# Patient Record
Sex: MALE | Race: WHITE | NOT HISPANIC OR LATINO
[De-identification: names, ages, dates, MRNs, and addresses within clinical notes are randomized per-mention and may not be internally consistent; named-entity substitution may affect disease eponyms.]

---
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
## 2021-01-08 ENCOUNTER — TRANSCRIPTION ENCOUNTER (OUTPATIENT)
Age: 21
End: 2021-01-08

## 2021-01-27 ENCOUNTER — NON-APPOINTMENT (OUTPATIENT)
Age: 21
End: 2021-01-27

## 2021-03-16 ENCOUNTER — NON-APPOINTMENT (OUTPATIENT)
Age: 21
End: 2021-03-16

## 2021-07-13 ENCOUNTER — RX CHANGE (OUTPATIENT)
Age: 21
End: 2021-07-13

## 2021-08-06 LAB
ALBUMIN SERPL ELPH-MCNC: 4.7 G/DL
ALP BLD-CCNC: 89 U/L
ALT SERPL-CCNC: 23 U/L
ANION GAP SERPL CALC-SCNC: 12 MMOL/L
AST SERPL-CCNC: 16 U/L
BASOPHILS # BLD AUTO: 0.03 K/UL
BASOPHILS NFR BLD AUTO: 0.5 %
BILIRUB SERPL-MCNC: 0.4 MG/DL
BUN SERPL-MCNC: 17 MG/DL
CALCIUM SERPL-MCNC: 10 MG/DL
CHLORIDE SERPL-SCNC: 103 MMOL/L
CO2 SERPL-SCNC: 26 MMOL/L
CREAT SERPL-MCNC: 1.01 MG/DL
EOSINOPHIL # BLD AUTO: 0.07 K/UL
EOSINOPHIL NFR BLD AUTO: 1.1 %
HCT VFR BLD CALC: 45.5 %
HGB BLD-MCNC: 15.9 G/DL
IMM GRANULOCYTES NFR BLD AUTO: 0.2 %
LYMPHOCYTES # BLD AUTO: 2.84 K/UL
LYMPHOCYTES NFR BLD AUTO: 44.9 %
MAN DIFF?: NORMAL
MCHC RBC-ENTMCNC: 30.8 PG
MCHC RBC-ENTMCNC: 34.9 GM/DL
MCV RBC AUTO: 88.2 FL
MONOCYTES # BLD AUTO: 0.49 K/UL
MONOCYTES NFR BLD AUTO: 7.8 %
NEUTROPHILS # BLD AUTO: 2.88 K/UL
NEUTROPHILS NFR BLD AUTO: 45.5 %
PLATELET # BLD AUTO: 201 K/UL
POTASSIUM SERPL-SCNC: 4.4 MMOL/L
PROT SERPL-MCNC: 6.8 G/DL
RBC # BLD: 5.16 M/UL
RBC # FLD: 12.7 %
SODIUM SERPL-SCNC: 141 MMOL/L
WBC # FLD AUTO: 6.32 K/UL

## 2021-08-09 LAB — OXCARBAZEPINE SERPL-MCNC: 13 UG/ML

## 2021-08-10 ENCOUNTER — APPOINTMENT (OUTPATIENT)
Dept: PEDIATRIC NEUROLOGY | Facility: CLINIC | Age: 21
End: 2021-08-10
Payer: COMMERCIAL

## 2021-08-10 PROCEDURE — 99214 OFFICE O/P EST MOD 30 MIN: CPT | Mod: 95

## 2021-08-10 NOTE — HISTORY OF PRESENT ILLNESS
[Home] : at home, [unfilled] , at the time of the visit. [Other Location: e.g. Home (Enter Location, City,State)___] : at [unfilled] [FreeTextEntry1] : 6/2015: Gordon was last seen here on February 12, 2014 . He is on Trileptal 150 mg 2 tablets twice a day. His EEG from January 26, 2014 showed spikes, right central parietal; altogether he had 2 seizures. Both seizures were convulsive the last one on January 4, 2014. During each seizures he also feels right-sided numbness. His brain CAT scan and MRI were normal in the past.\par \par 1/12/2016   accompanied by his parents. Ambulatory EEG from December 2015 continues to show right central temporal spikes. He is now on Trileptal 150 mg tablets 3 tablets twice daily and has been seizure-free for nearly 2 years. No other physical complaints. Most recent cardiological evaluation ruled out cardiomyopathy. \par \par 6/8/16   accompanied by his mother. Remains seizure-free on 150 mg tablets 3 tablets twice daily. No physical complaints\par \par 12/13/2016  Here with mother. Remains seizure free on above dose of Trileptal; No physical complaints\par \par 6/14/2017: with mother. Remains seizure free on above dose of Trileptal; No physical complaints. Mother reported that [facility] restarted Gordon on Metoprolol 50mg reportedly for enlarged heart.\par 6/9/17 CBC, CMP normal. Trileptal level 16. \par \par 9/26/17: with mother. Remains seizure free. Driving.  On Metoprolol reportedly for enlarged hear. Trileptal level 12 (10-35). On 9/21/17 17 EEG continues to show right centro-temporal spikes. \par \par 3/2/2018: with parents. Remains seizure for 4 years. On Metoprolol reportedly for enlarged heart. Last Trileptal level 12 (10-35). On 9/21/17 17 EEG continues to show right centro-temporal spikes. \par \par 10/30/2018:  with parents. Remains seizure for 4 years. On Metoprolol reportedly for enlarged heart. EEG continues to show right centro-temporal spikes. On Oxcarbazepine 600mg BID. \par \par 6/23/2020 with mother in a Telehealth visit. Remains seizure for >4 years. On Nadolol for enlarged heart. EEG continues to show right centro-temporal spikes. On Oxcarbazepine 600mg BID. \par \par 12/1/2020 with mother in a Telehealth visit. Remains seizure for >4 years. On Nadolol for enlarged heart. EEG continues to show right centro-temporal spikes. On Oxcarbazepine 600mg BID. \par \par 8/10/2021 with mother in a Telehealth visit. Remains seizure for >6  years. Seeing a cardiologist in [facility] \par  On Oxcarbazepine 600mg BID. \par \par \par \par \par

## 2021-08-10 NOTE — ASSESSMENT
[FreeTextEntry1] : \par Remains seizure free on Oxcarbazepine 600mg BID. Normal limited exam \par Not interested in coming off medication or in changing to an ER formulation. \par \par 
Universal Safety Interventions

## 2021-08-10 NOTE — PHYSICAL EXAM
[Well-appearing] : well-appearing [Normocephalic] : normocephalic [No dysmorphic facial features] : no dysmorphic facial features [No ocular abnormalities] : no ocular abnormalities [Alert] : alert [Conversant] : conversant [Normal speech and language] : normal speech and language [Follows instructions well] : follows instructions well [Full extraocular movements] : full extraocular movements [No nystagmus] : no nystagmus [No facial asymmetry or weakness] : no facial asymmetry or weakness [No abnormal involuntary movements] : no abnormal involuntary movements [Walks and runs well] : walks and runs well [Normal gait] : normal gait

## 2022-03-24 ENCOUNTER — RX RENEWAL (OUTPATIENT)
Age: 22
End: 2022-03-24

## 2022-03-25 ENCOUNTER — NON-APPOINTMENT (OUTPATIENT)
Age: 22
End: 2022-03-25

## 2022-04-20 ENCOUNTER — RX CHANGE (OUTPATIENT)
Age: 22
End: 2022-04-20

## 2022-06-07 LAB
ALBUMIN SERPL ELPH-MCNC: 4.9 G/DL
ALP BLD-CCNC: 90 U/L
ALT SERPL-CCNC: 22 U/L
ANION GAP SERPL CALC-SCNC: 16 MMOL/L
AST SERPL-CCNC: 20 U/L
BASOPHILS # BLD AUTO: 0.02 K/UL
BASOPHILS NFR BLD AUTO: 0.3 %
BILIRUB SERPL-MCNC: 0.2 MG/DL
BUN SERPL-MCNC: 17 MG/DL
CALCIUM SERPL-MCNC: 9.8 MG/DL
CHLORIDE SERPL-SCNC: 104 MMOL/L
CO2 SERPL-SCNC: 22 MMOL/L
CREAT SERPL-MCNC: 1.09 MG/DL
EGFR: 99 ML/MIN/1.73M2
EOSINOPHIL # BLD AUTO: 0.08 K/UL
EOSINOPHIL NFR BLD AUTO: 1.4 %
GLUCOSE SERPL-MCNC: 88 MG/DL
HCT VFR BLD CALC: 48.3 %
HGB BLD-MCNC: 16.2 G/DL
IMM GRANULOCYTES NFR BLD AUTO: 0 %
LYMPHOCYTES # BLD AUTO: 2.2 K/UL
LYMPHOCYTES NFR BLD AUTO: 37.9 %
MAN DIFF?: NORMAL
MCHC RBC-ENTMCNC: 29.3 PG
MCHC RBC-ENTMCNC: 33.5 GM/DL
MCV RBC AUTO: 87.5 FL
MONOCYTES # BLD AUTO: 0.42 K/UL
MONOCYTES NFR BLD AUTO: 7.2 %
NEUTROPHILS # BLD AUTO: 3.08 K/UL
NEUTROPHILS NFR BLD AUTO: 53.2 %
PLATELET # BLD AUTO: 218 K/UL
POTASSIUM SERPL-SCNC: 4.5 MMOL/L
PROT SERPL-MCNC: 7.1 G/DL
RBC # BLD: 5.52 M/UL
RBC # FLD: 12.4 %
SODIUM SERPL-SCNC: 142 MMOL/L
WBC # FLD AUTO: 5.8 K/UL

## 2022-06-09 ENCOUNTER — APPOINTMENT (OUTPATIENT)
Dept: PEDIATRIC NEUROLOGY | Facility: CLINIC | Age: 22
End: 2022-06-09
Payer: COMMERCIAL

## 2022-06-09 PROCEDURE — 99214 OFFICE O/P EST MOD 30 MIN: CPT | Mod: 95

## 2022-06-09 NOTE — HISTORY OF PRESENT ILLNESS
[Home] : at home, [unfilled] , at the time of the visit. [Other Location: e.g. Home (Enter Location, City,State)___] : at [unfilled] [FreeTextEntry1] : 6/2015: Gordon was last seen here on February 12, 2014 . He is on Trileptal 150 mg 2 tablets twice a day. His EEG from January 26, 2014 showed spikes, right central parietal; altogether he had 2 seizures. Both seizures were convulsive the last one on January 4, 2014. During each seizures he also feels right-sided numbness. His brain CAT scan and MRI were normal in the past.\par \par 1/12/2016   accompanied by his parents. Ambulatory EEG from December 2015 continues to show right central temporal spikes. He is now on Trileptal 150 mg tablets 3 tablets twice daily and has been seizure-free for nearly 2 years. No other physical complaints. Most recent cardiological evaluation ruled out cardiomyopathy. \par \par 6/8/16   accompanied by his mother. Remains seizure-free on 150 mg tablets 3 tablets twice daily. No physical complaints\par \par 12/13/2016  Here with mother. Remains seizure free on above dose of Trileptal; No physical complaints\par \par 6/14/2017: with mother. Remains seizure free on above dose of Trileptal; No physical complaints. Mother reported that [facility] restarted Gordon on Metoprolol 50mg reportedly for enlarged heart.\par 6/9/17 CBC, CMP normal. Trileptal level 16. \par \par 9/26/17: with mother. Remains seizure free. Driving.  On Metoprolol reportedly for enlarged hear. Trileptal level 12 (10-35). On 9/21/17 17 EEG continues to show right centro-temporal spikes. \par \par 3/2/2018: with parents. Remains seizure for 4 years. On Metoprolol reportedly for enlarged heart. Last Trileptal level 12 (10-35). On 9/21/17 17 EEG continues to show right centro-temporal spikes. \par \par 10/30/2018:  with parents. Remains seizure for 4 years. On Metoprolol reportedly for enlarged heart. EEG continues to show right centro-temporal spikes. On Oxcarbazepine 600mg BID. \par \par 6/23/2020 with mother in a Telehealth visit. Remains seizure for >4 years. On Nadolol for enlarged heart. EEG continues to show right centro-temporal spikes. On Oxcarbazepine 600mg BID. \par \par 12/1/2020 with mother in a Telehealth visit. Remains seizure for >4 years. On Nadolol for enlarged heart. EEG continues to show right centro-temporal spikes. On Oxcarbazepine 600mg BID. \par \par 8/10/2021 with mother in a Telehealth visit. Remains seizure for >6  years. Seeing a cardiologist in [facility] \par  On Oxcarbazepine 600mg BID. \par \par 6/9/2022  with mother in a Telehealth visit. Remains seizure for >6  years. Seeing a cardiologist in [facility] \par  On Oxcarbazepine 600mg BID. \par \par \par \par \par

## 2022-06-09 NOTE — ASSESSMENT
[FreeTextEntry1] : \par Remains seizure free on Oxcarbazepine 600mg BID. Normal limited exam \par Will consider trying to come off medication next year.  \par \par

## 2022-06-13 LAB — OXCARBAZEPINE SERPL-MCNC: 14 UG/ML

## 2022-08-12 ENCOUNTER — RX RENEWAL (OUTPATIENT)
Age: 22
End: 2022-08-12

## 2022-08-17 ENCOUNTER — NON-APPOINTMENT (OUTPATIENT)
Age: 22
End: 2022-08-17

## 2022-11-13 ENCOUNTER — RX RENEWAL (OUTPATIENT)
Age: 22
End: 2022-11-13

## 2023-05-11 ENCOUNTER — RX RENEWAL (OUTPATIENT)
Age: 23
End: 2023-05-11

## 2023-05-11 ENCOUNTER — RX CHANGE (OUTPATIENT)
Age: 23
End: 2023-05-11

## 2023-05-16 ENCOUNTER — APPOINTMENT (OUTPATIENT)
Dept: PEDIATRIC NEUROLOGY | Facility: CLINIC | Age: 23
End: 2023-05-16
Payer: COMMERCIAL

## 2023-05-16 ENCOUNTER — NON-APPOINTMENT (OUTPATIENT)
Age: 23
End: 2023-05-16

## 2023-05-16 PROCEDURE — 99214 OFFICE O/P EST MOD 30 MIN: CPT | Mod: 95

## 2023-05-16 RX ORDER — OXCARBAZEPINE 600 MG/1
600 TABLET, FILM COATED ORAL
Qty: 60 | Refills: 6 | Status: ACTIVE | COMMUNITY
Start: 2017-09-26 | End: 1900-01-01

## 2023-05-16 NOTE — HISTORY OF PRESENT ILLNESS
[Home] : at home, [unfilled] , at the time of the visit. [Other Location: e.g. Home (Enter Location, City,State)___] : at [unfilled] [FreeTextEntry1] : 6/2015: Gordon was last seen here on February 12, 2014 . He is on Trileptal 150 mg 2 tablets twice a day. His EEG from January 26, 2014 showed spikes, right central parietal; altogether he had 2 seizures. Both seizures were convulsive the last one on January 4, 2014. During each seizures he also feels right-sided numbness. His brain CAT scan and MRI were normal in the past.\par \par 1/12/2016   accompanied by his parents. Ambulatory EEG from December 2015 continues to show right central temporal spikes. He is now on Trileptal 150 mg tablets 3 tablets twice daily and has been seizure-free for nearly 2 years. No other physical complaints. Most recent cardiological evaluation ruled out cardiomyopathy. \par \par 6/8/16   accompanied by his mother. Remains seizure-free on 150 mg tablets 3 tablets twice daily. No physical complaints\par \par 12/13/2016  Here with mother. Remains seizure free on above dose of Trileptal; No physical complaints\par \par 6/14/2017: with mother. Remains seizure free on above dose of Trileptal; No physical complaints. Mother reported that [facility] restarted Gordon on Metoprolol 50mg reportedly for enlarged heart.\par 6/9/17 CBC, CMP normal. Trileptal level 16. \par \par 9/26/17: with mother. Remains seizure free. Driving.  On Metoprolol reportedly for enlarged hear. Trileptal level 12 (10-35). On 9/21/17 17 EEG continues to show right centro-temporal spikes. \par \par 3/2/2018: with parents. Remains seizure for 4 years. On Metoprolol reportedly for enlarged heart. Last Trileptal level 12 (10-35). On 9/21/17 17 EEG continues to show right centro-temporal spikes. \par \par 10/30/2018:  with parents. Remains seizure for 4 years. On Metoprolol reportedly for enlarged heart. EEG continues to show right centro-temporal spikes. On Oxcarbazepine 600mg BID. \par \par 6/23/2020 with mother in a Telehealth visit. Remains seizure for >4 years. On Nadolol for enlarged heart. EEG continues to show right centro-temporal spikes. On Oxcarbazepine 600mg BID. \par \par 12/1/2020 with mother in a Telehealth visit. Remains seizure for >4 years. On Nadolol for enlarged heart. EEG continues to show right centro-temporal spikes. On Oxcarbazepine 600mg BID. \par \par 8/10/2021 with mother in a Telehealth visit. Remains seizure for >6  years. Seeing a cardiologist in [facility] \par  On Oxcarbazepine 600mg BID. \par \par 6/9/2022  with mother in a Telehealth visit. Remains seizure for >6  years. Seeing a cardiologist in [facility] \par  On Oxcarbazepine 600mg BID. \par \par 5/16/2023  by himself in a Telehealth visit. Remains seizure free for >6 years On Oxcarbazepine 600mg BID. No new complaints. \par \par \par \par \par

## 2023-05-26 ENCOUNTER — NON-APPOINTMENT (OUTPATIENT)
Age: 23
End: 2023-05-26

## 2023-07-11 ENCOUNTER — APPOINTMENT (OUTPATIENT)
Dept: PEDIATRIC NEUROLOGY | Facility: CLINIC | Age: 23
End: 2023-07-11
Payer: COMMERCIAL

## 2023-07-11 VITALS
DIASTOLIC BLOOD PRESSURE: 76 MMHG | WEIGHT: 236 LBS | SYSTOLIC BLOOD PRESSURE: 121 MMHG | HEART RATE: 47 BPM | HEIGHT: 77 IN | BODY MASS INDEX: 27.87 KG/M2

## 2023-07-11 DIAGNOSIS — R94.01 ABNORMAL ELECTROENCEPHALOGRAM [EEG]: ICD-10-CM

## 2023-07-11 PROCEDURE — 99214 OFFICE O/P EST MOD 30 MIN: CPT

## 2023-07-11 PROCEDURE — 99204 OFFICE O/P NEW MOD 45 MIN: CPT

## 2023-07-11 NOTE — PHYSICAL EXAM
[Well-appearing] : well-appearing [Normocephalic] : normocephalic [No dysmorphic facial features] : no dysmorphic facial features [No ocular abnormalities] : no ocular abnormalities [Alert] : alert [Conversant] : conversant [Normal speech and language] : normal speech and language [Follows instructions well] : follows instructions well [Full extraocular movements] : full extraocular movements [No nystagmus] : no nystagmus [No facial asymmetry or weakness] : no facial asymmetry or weakness [No abnormal involuntary movements] : no abnormal involuntary movements [Walks and runs well] : walks and runs well [Normal gait] : normal gait [No abnormal neurocutaneous stigmata or skin lesions] : no abnormal neurocutaneous stigmata or skin lesions [Straight] : straight [No nayla or dimples] : no nayla or dimples [No deformities] : no deformities [Well related, good eye contact] : well related, good eye contact

## 2023-07-11 NOTE — HISTORY OF PRESENT ILLNESS
[Home] : at home, [unfilled] , at the time of the visit. [Other Location: e.g. Home (Enter Location, City,State)___] : at [unfilled] [FreeTextEntry1] : 6/2015: Gordon was last seen here on February 12, 2014 . He is on Trileptal 150 mg 2 tablets twice a day. His EEG from January 26, 2014 showed spikes, right central parietal; altogether he had 2 seizures. Both seizures were convulsive the last one on January 4, 2014. During each seizures he also feels right-sided numbness. His brain CAT scan and MRI were normal in the past.\par \par 1/12/2016   accompanied by his parents. Ambulatory EEG from December 2015 continues to show right central temporal spikes. He is now on Trileptal 150 mg tablets 3 tablets twice daily and has been seizure-free for nearly 2 years. No other physical complaints. Most recent cardiological evaluation ruled out cardiomyopathy. \par \par 6/8/16   accompanied by his mother. Remains seizure-free on 150 mg tablets 3 tablets twice daily. No physical complaints\par \par 12/13/2016  Here with mother. Remains seizure free on above dose of Trileptal; No physical complaints\par \par 6/14/2017: with mother. Remains seizure free on above dose of Trileptal; No physical complaints. Mother reported that [facility] restarted Gordon on Metoprolol 50mg reportedly for enlarged heart.\par 6/9/17 CBC, CMP normal. Trileptal level 16. \par \par 9/26/17: with mother. Remains seizure free. Driving.  On Metoprolol reportedly for enlarged hear. Trileptal level 12 (10-35). On 9/21/17 17 EEG continues to show right centro-temporal spikes. \par \par 3/2/2018: with parents. Remains seizure for 4 years. On Metoprolol reportedly for enlarged heart. Last Trileptal level 12 (10-35). On 9/21/17 17 EEG continues to show right centro-temporal spikes. \par \par 10/30/2018:  with parents. Remains seizure for 4 years. On Metoprolol reportedly for enlarged heart. EEG continues to show right centro-temporal spikes. On Oxcarbazepine 600mg BID. \par \par 6/23/2020 with mother in a Telehealth visit. Remains seizure for >4 years. On Nadolol for enlarged heart. EEG continues to show right centro-temporal spikes. On Oxcarbazepine 600mg BID. \par \par 12/1/2020 with mother in a Telehealth visit. Remains seizure for >4 years. On Nadolol for enlarged heart. EEG continues to show right centro-temporal spikes. On Oxcarbazepine 600mg BID. \par \par 8/10/2021 with mother in a Telehealth visit. Remains seizure for >6  years. Seeing a cardiologist in [facility] \par  On Oxcarbazepine 600mg BID. \par \par 6/9/2022  with mother in a Telehealth visit. Remains seizure for >6  years. Seeing a cardiologist in [facility] \par  On Oxcarbazepine 600mg BID. \par \par 5/16/2023  by himself in a Telehealth visit. Remains seizure free for >6 years On Oxcarbazepine 600mg BID. No new complaints. \par \par 7/11/2023 with his parents. Remains seizure free on Oxcarbazepine 600mg BID. Does not always take the night dose . Also on B Blocker for cardiac myopathy \par \par \par \par

## 2023-07-11 NOTE — REASON FOR VISIT
No scleral icterus [Follow-Up Evaluation] : a follow-up evaluation for [Seizure] : seizure [Mother] : mother [Patient] : patient

## 2023-07-11 NOTE — REASON FOR VISIT
[Follow-Up Evaluation] : a follow-up evaluation for [Seizure] : seizure [Parents] : parents [Mother] : mother [Patient] : patient

## 2023-07-12 LAB
ALBUMIN SERPL ELPH-MCNC: 4.7 G/DL
ALP BLD-CCNC: 81 U/L
ALT SERPL-CCNC: 17 U/L
ANION GAP SERPL CALC-SCNC: 14 MMOL/L
AST SERPL-CCNC: 18 U/L
BILIRUB SERPL-MCNC: 0.6 MG/DL
BUN SERPL-MCNC: 18 MG/DL
CALCIUM SERPL-MCNC: 10.1 MG/DL
CHLORIDE SERPL-SCNC: 103 MMOL/L
CO2 SERPL-SCNC: 24 MMOL/L
CREAT SERPL-MCNC: 1.17 MG/DL
EGFR: 90 ML/MIN/1.73M2
POTASSIUM SERPL-SCNC: 4.6 MMOL/L
PROT SERPL-MCNC: 7.3 G/DL
SODIUM SERPL-SCNC: 141 MMOL/L

## 2023-07-17 LAB — OXCARBAZEPINE SERPL-MCNC: 6 UG/ML

## 2023-07-25 ENCOUNTER — APPOINTMENT (OUTPATIENT)
Dept: PEDIATRIC NEUROLOGY | Facility: CLINIC | Age: 23
End: 2023-07-25
Payer: COMMERCIAL

## 2023-07-25 PROCEDURE — 95816 EEG AWAKE AND DROWSY: CPT

## 2023-07-31 ENCOUNTER — APPOINTMENT (OUTPATIENT)
Dept: PEDIATRIC NEUROLOGY | Facility: CLINIC | Age: 23
End: 2023-07-31
Payer: COMMERCIAL

## 2023-07-31 PROCEDURE — 95719 EEG PHYS/QHP EA INCR W/O VID: CPT

## 2023-08-07 ENCOUNTER — APPOINTMENT (OUTPATIENT)
Dept: PEDIATRIC NEUROLOGY | Facility: CLINIC | Age: 23
End: 2023-08-07
Payer: COMMERCIAL

## 2023-08-07 DIAGNOSIS — G40.909 EPILEPSY, UNSPECIFIED, NOT INTRACTABLE, W/OUT STATUS EPILEPTICUS: ICD-10-CM

## 2023-08-07 PROCEDURE — 99214 OFFICE O/P EST MOD 30 MIN: CPT | Mod: 95

## 2023-08-07 NOTE — PLAN
[FreeTextEntry1] : I advised that based on the data available the risk of a seizure recurrence after coming off medication would be at about 25%. If ryan decides to come off medication, I advised taking Oxcarbazepine 300mg once daily x 2 weeks and then to discontinue it.  He will consider this information and will call with his decision.

## 2023-08-07 NOTE — HISTORY OF PRESENT ILLNESS
[Home] : at home, [unfilled] , at the time of the visit. [Other Location: e.g. Home (Enter Location, City,State)___] : at [unfilled] [FreeTextEntry1] : 6/2015: Gordon was last seen here on February 12, 2014 . He is on Trileptal 150 mg 2 tablets twice a day. His EEG from January 26, 2014 showed spikes, right central parietal; altogether he had 2 seizures. Both seizures were convulsive the last one on January 4, 2014. During each seizures he also feels right-sided numbness. His brain CAT scan and MRI were normal in the past.  1/12/2016   accompanied by his parents. Ambulatory EEG from December 2015 continues to show right central temporal spikes. He is now on Trileptal 150 mg tablets 3 tablets twice daily and has been seizure-free for nearly 2 years. No other physical complaints. Most recent cardiological evaluation ruled out cardiomyopathy.   6/8/16   accompanied by his mother. Remains seizure-free on 150 mg tablets 3 tablets twice daily. No physical complaints  12/13/2016  Here with mother. Remains seizure free on above dose of Trileptal; No physical complaints  6/14/2017: with mother. Remains seizure free on above dose of Trileptal; No physical complaints. Mother reported that [facility] restarted Gordon on Metoprolol 50mg reportedly for enlarged heart. 6/9/17 CBC, CMP normal. Trileptal level 16.   9/26/17: with mother. Remains seizure free. Driving.  On Metoprolol reportedly for enlarged hear. Trileptal level 12 (10-35). On 9/21/17 17 EEG continues to show right centro-temporal spikes.   3/2/2018: with parents. Remains seizure for 4 years. On Metoprolol reportedly for enlarged heart. Last Trileptal level 12 (10-35). On 9/21/17 17 EEG continues to show right centro-temporal spikes.   10/30/2018:  with parents. Remains seizure for 4 years. On Metoprolol reportedly for enlarged heart. EEG continues to show right centro-temporal spikes. On Oxcarbazepine 600mg BID.   6/23/2020 with mother in a Telehealth visit. Remains seizure for >4 years. On Nadolol for enlarged heart. EEG continues to show right centro-temporal spikes. On Oxcarbazepine 600mg BID.   12/1/2020 with mother in a Telehealth visit. Remains seizure for >4 years. On Nadolol for enlarged heart. EEG continues to show right centro-temporal spikes. On Oxcarbazepine 600mg BID.   8/10/2021 with mother in a Telehealth visit. Remains seizure for >6  years. Seeing a cardiologist in [facility]   On Oxcarbazepine 600mg BID.   6/9/2022  with mother in a Telehealth visit. Remains seizure for >6  years. Seeing a cardiologist in [facility]   On Oxcarbazepine 600mg BID.   5/16/2023  by himself in a Telehealth visit. Remains seizure free for >6 years On Oxcarbazepine 600mg BID. No new complaints.   7/11/2023 with his parents. Remains seizure free on Oxcarbazepine 600mg BID. Does not always take the night dose . Also on B Blocker for cardiac myopathy   8/7/2023 with mother in a Telehealth visit. I advised that the REEG and the AEEG done recently was normal. The most recent blood level of Oxcarbazepine from July 2023 was 6. Gordon said that he takes the Oxcarbazepine 600mg, 1 tablet daily.

## 2023-08-07 NOTE — PHYSICAL EXAM
[Well-appearing] : well-appearing [Normocephalic] : normocephalic [No dysmorphic facial features] : no dysmorphic facial features [No ocular abnormalities] : no ocular abnormalities [Well related, good eye contact] : well related, good eye contact [Conversant] : conversant [Follows instructions well] : follows instructions well [Full extraocular movements] : full extraocular movements [No nystagmus] : no nystagmus [No facial asymmetry or weakness] : no facial asymmetry or weakness [No abnormal involuntary movements] : no abnormal involuntary movements [Walks and runs well] : walks and runs well [Normal gait] : normal gait

## 2023-08-07 NOTE — REASON FOR VISIT
[Home] : at home, [unfilled] , at the time of the visit. [Medical Office: ([facility])___] : at the medical office located in  [Self] : self [Follow-Up Evaluation] : a follow-up evaluation for [Seizure] : seizure [Parents] : parents [Mother] : mother [Patient] : patient

## 2023-08-07 NOTE — ASSESSMENT
[FreeTextEntry1] : Remains seizure free on Oxcarbazepine 600mg that he decided to take once daily.

## 2023-08-25 ENCOUNTER — RX RENEWAL (OUTPATIENT)
Age: 23
End: 2023-08-25
